# Patient Record
Sex: FEMALE | Race: WHITE | Employment: FULL TIME | URBAN - METROPOLITAN AREA
[De-identification: names, ages, dates, MRNs, and addresses within clinical notes are randomized per-mention and may not be internally consistent; named-entity substitution may affect disease eponyms.]

---

## 2019-04-24 ENCOUNTER — HOSPITAL ENCOUNTER (EMERGENCY)
Age: 64
Discharge: HOME | End: 2019-04-24
Attending: EMERGENCY MEDICINE
Payer: MEDICARE

## 2019-04-24 ENCOUNTER — APPOINTMENT (OUTPATIENT)
Dept: CT IMAGING | Age: 64
End: 2019-04-24
Attending: PHYSICIAN ASSISTANT
Payer: MEDICARE

## 2019-04-24 VITALS
WEIGHT: 215 LBS | BODY MASS INDEX: 40.59 KG/M2 | DIASTOLIC BLOOD PRESSURE: 69 MMHG | RESPIRATION RATE: 20 BRPM | SYSTOLIC BLOOD PRESSURE: 150 MMHG | HEART RATE: 63 BPM | OXYGEN SATURATION: 97 % | HEIGHT: 61 IN | TEMPERATURE: 97.7 F

## 2019-04-24 DIAGNOSIS — S00.03XA SCALP HEMATOMA, INITIAL ENCOUNTER: ICD-10-CM
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
## 2019-04-24 DIAGNOSIS — S09.90XA CLOSED HEAD INJURY, INITIAL ENCOUNTER: Primary | ICD-10-CM

## 2019-04-24 DIAGNOSIS — S06.0X0A CONCUSSION WITHOUT LOSS OF CONSCIOUSNESS, INITIAL ENCOUNTER: ICD-10-CM

## 2019-04-24 PROCEDURE — 74011250636 HC RX REV CODE- 250/636: Performed by: PHYSICIAN ASSISTANT

## 2019-04-24 PROCEDURE — 70450 CT HEAD/BRAIN W/O DYE: CPT

## 2019-04-24 PROCEDURE — 74011250637 HC RX REV CODE- 250/637: Performed by: PHYSICIAN ASSISTANT

## 2019-04-24 PROCEDURE — 99283 EMERGENCY DEPT VISIT LOW MDM: CPT

## 2019-04-24 RX ORDER — ONDANSETRON 4 MG/1
4 TABLET, ORALLY DISINTEGRATING ORAL
Qty: 12 TAB | Refills: 0 | Status: SHIPPED | OUTPATIENT
Start: 2019-04-24

## 2019-04-24 RX ORDER — MECLIZINE HYDROCHLORIDE 25 MG/1
25 TABLET ORAL
Qty: 21 TAB | Refills: 0 | Status: SHIPPED | OUTPATIENT
Start: 2019-04-24 | End: 2019-05-01

## 2019-04-24 RX ORDER — METOPROLOL SUCCINATE 50 MG/1
75 TABLET, EXTENDED RELEASE ORAL
COMMUNITY
Start: 2018-04-18

## 2019-04-24 RX ORDER — ACETAMINOPHEN 500 MG
1000 TABLET ORAL
Status: DISCONTINUED | OUTPATIENT
Start: 2019-04-24 | End: 2019-04-24 | Stop reason: HOSPADM

## 2019-04-24 RX ORDER — SODIUM CHLORIDE FOR INHALATION 0.9 %
1 VIAL, NEBULIZER (ML) INHALATION
COMMUNITY

## 2019-04-24 RX ORDER — ATORVASTATIN CALCIUM 40 MG/1
TABLET, FILM COATED ORAL
COMMUNITY
Start: 2018-06-21

## 2019-04-24 RX ORDER — ONDANSETRON 4 MG/1
8 TABLET, ORALLY DISINTEGRATING ORAL
Status: COMPLETED | OUTPATIENT
Start: 2019-04-24 | End: 2019-04-24

## 2019-04-24 RX ORDER — OMEPRAZOLE 20 MG/1
20 CAPSULE, DELAYED RELEASE ORAL
COMMUNITY
Start: 2013-12-27

## 2019-04-24 RX ORDER — MECLIZINE HCL 12.5 MG 12.5 MG/1
25 TABLET ORAL
Status: COMPLETED | OUTPATIENT
Start: 2019-04-24 | End: 2019-04-24

## 2019-04-24 RX ADMIN — ONDANSETRON 8 MG: 4 TABLET, ORALLY DISINTEGRATING ORAL at 11:39

## 2019-04-24 RX ADMIN — MECLIZINE 25 MG: 12.5 TABLET ORAL at 11:39

## 2019-04-24 NOTE — ED TRIAGE NOTES
Patient states she fell down 2 steps in the garage. Patient with complaints of headache, vomiting and lump on head. No LOC

## 2019-04-24 NOTE — LETTER
[facility] [place] 
[facility] 
[address] [id number] [phone] Work/School Note Date: 4/24/2019 To Whom It May concern: 
 
Anne Vasquez was seen and treated today in the emergency room by the following provider(s): 
Attending Provider: Basilio Rooney DO Physician Assistant: Wili Prince PA-C. Anne Vasquez may return to work on 05/01/2019. Sincerely, Cici Kimball PA-C

## 2019-04-24 NOTE — ED PROVIDER NOTES
EMERGENCY DEPARTMENT HISTORY AND PHYSICAL EXAM 
 
Date: 4/24/2019 Patient Name: Charly Hinson History of Presenting Illness Chief Complaint Patient presents with  
 Head Injury History Provided By: Patient Chief Complaint: head trauma HPI(Context):  
11:20 AM 
Charly Hinson is a 70 y.o. female with PMHX of asthma, HTN, HLP who presents to the emergency department C/O head trauma. Pt had mechanical fall on steps at home 5 hours ago. Associated sxs include headache, nausea, vomiting, dizziness. Pt was able to rise to her feet without assistance. No LOC. Pt went to [facility] but told to come to ED. Pt denies visual disturbances, N/W/T, neck or back pain, blood thinner use, ETOH use, and any other sxs or complaints. PCP: Mely Kohler MD 
 
Current Facility-Administered Medications Medication Dose Route Frequency Provider Last Rate Last Dose  acetaminophen (TYLENOL) tablet 1,000 mg  1,000 mg Oral NOW Rodrick Waggoner PA-C Current Outpatient Medications Medication Sig Dispense Refill  omeprazole (PRILOSEC) 20 mg capsule Take 20 mg by mouth.  atorvastatin (LIPITOR) 40 mg tablet take 1 tablet by mouth once daily  metoprolol succinate (TOPROL-XL) 50 mg XL tablet Take 75 mg by mouth.  ondansetron (ZOFRAN ODT) 4 mg disintegrating tablet Take 1 Tab by mouth every eight (8) hours as needed for Nausea (or vomiting.). Allow to dissolve on tongue 12 Tab 0  
 meclizine (ANTIVERT) 25 mg tablet Take 1 Tab by mouth three (3) times daily as needed for Dizziness for up to 7 days. 21 Tab 0  
 conjugated estrogens (PREMARIN) 0.625 mg/gram vaginal cream Insert 0.5 g into vagina.  sodium chloride 0.9 % nebu Take 1 Spray by inhalation. Past History Past Medical History: 
Past Medical History:  
Diagnosis Date  Asthma   
 Hiatal hernia  Menopause Age 46  MVP (mitral valve prolapse)  Sjogren's disease ([facility] [address]  Unspecified endocrine disorder Premarin cream  
 
 
Past Surgical History: 
Past Surgical History:  
Procedure Laterality Date  HX CATARACT REMOVAL    
 HX HYSTERECTOMY Age 45  HX KNEE ARTHROSCOPY    
 HX TUBAL LIGATION Family History: No family history on file. Social History: 
Social History Tobacco Use  Smoking status: Never Smoker  Smokeless tobacco: Never Used Substance Use Topics  Alcohol use: Yes  Drug use: Not on file Allergies: Allergies Allergen Reactions  Aztreonam Rash  Celecoxib Rash  Cephalosporins Rash  Pcn [Penicillins] Rash  Sulfa (Sulfonamide Antibiotics) Rash Review of Systems Review of Systems Constitutional: Negative for activity change. HENT: Negative for facial swelling. Eyes: Negative for photophobia and visual disturbance. Gastrointestinal: Positive for nausea and vomiting. Musculoskeletal: Negative for back pain and neck pain. Skin: Negative for wound. Neurological: Positive for dizziness and headaches. Negative for syncope, weakness, light-headedness and numbness. Hematological: Does not bruise/bleed easily. Psychiatric/Behavioral: Negative for confusion. All other systems reviewed and are negative. Physical Exam  
 
Vitals:  
 04/24/19 1111 BP: 150/69 Pulse: 63 Resp: 20 Temp: 97.7 °F (36.5 °C) SpO2: 97% Weight: 97.5 kg (215 lb) Height: 5' 1\" (1.549 m) Physical Exam  
Constitutional: She is oriented to person, place, and time. She appears well-developed and well-nourished. No distress.  female in NAD. Alert. Laying supine. Answering questions. Following directions. HENT:  
Head: Normocephalic and atraumatic. Head is without raccoon's eyes, without Cuello's sign, without abrasion and without contusion. Right Ear: External ear normal. No hemotympanum. Left Ear: External ear normal. No hemotympanum. Nose: Nose normal. No rhinorrhea. Mouth/Throat: Uvula is midline, oropharynx is clear and moist and mucous membranes are normal.  
Eyes: Pupils are equal, round, and reactive to light. Conjunctivae and EOM are normal. Right eye exhibits no discharge. Neck: Normal range of motion and full passive range of motion without pain. No spinous process tenderness and no muscular tenderness present. Normal range of motion present. Cardiovascular: Normal rate, regular rhythm, normal heart sounds and intact distal pulses. Exam reveals no gallop and no friction rub. No murmur heard. Pulmonary/Chest: Effort normal and breath sounds normal. No accessory muscle usage. No tachypnea. No respiratory distress. She has no decreased breath sounds. She has no wheezes. She has no rhonchi. She has no rales. Musculoskeletal: Normal range of motion. Thoracic back: She exhibits normal range of motion, no tenderness, no bony tenderness, no swelling, no deformity (no step off), no pain and no spasm. Lumbar back: She exhibits normal range of motion, no tenderness, no bony tenderness, no swelling, no deformity (no step off), no pain and no spasm. Lymphadenopathy:  
  She has no cervical adenopathy. Neurological: She is alert and oriented to person, place, and time. She has normal strength. No cranial nerve deficit or sensory deficit. Coordination normal. GCS eye subscore is 4. GCS verbal subscore is 5. GCS motor subscore is 6. Skin: Skin is warm and dry. No rash noted. She is not diaphoretic. No erythema. Psychiatric: She has a normal mood and affect. Judgment normal.  
Nursing note and vitals reviewed. Diagnostic Study Results Labs - No results found for this or any previous visit (from the past 12 hour(s)). CT HEAD WO CONT Final Result IMPRESSION:  
  
  
1. No acute intracranial abnormality. 2. Moderately large posterior superior occipital hematoma. No retained  
radiopaque foreign object or fracture. CT Results  (Last 48 hours) 04/24/19 1211  CT HEAD WO CONT Final result Impression:  IMPRESSION:  
   
   
1. No acute intracranial abnormality. 2. Moderately large posterior superior occipital hematoma. No retained  
radiopaque foreign object or fracture. Narrative:  EXAM: CT head INDICATION: Fall, nausea with vomiting. Occipital hematoma. COMPARISON: None. TECHNIQUE: Axial CT imaging of the head was performed without intravenous  
contrast.  
   
One or more dose reduction techniques were used on this CT: automated exposure  
control, adjustment of the mAs and/or kVp according to patient size, and  
iterative reconstruction techniques. The specific techniques used on this CT  
exam have been documented in the patient's electronic medical record. Digital  
Imaging and Communications in Medicine (DICOM) format image data are available  
to nonaffiliated external healthcare facilities or entities on a secure, media  
free, reciprocally searchable basis with patient authorization for at least a  
12-month period after this study. _______________ FINDINGS:  
   
BRAIN AND POSTERIOR FOSSA: Cortical and cerebellar volume loss is present,  
within normal limits for patient age. The ventricular size and configuration is  
normal. Basilar cisterns patent. There is no intracranial hemorrhage, mass  
effect, or midline shift. The gray-white matter differentiation is within normal  
limits. EXTRA-AXIAL SPACES AND MENINGES: No acute extra-axial fluid collection. CALVARIUM: No acute osseous abnormality. SINUSES: Imaged paranasal sinuses and mastoid air cells are clear. OTHER: Moderately large posterior superior occipital hematoma noted. No retained  
radiopaque foreign object.  
   
_______________ CXR Results  (Last 48 hours) None Medications given in the ED- Medications acetaminophen (TYLENOL) tablet 1,000 mg (has no administration in time range)  
ondansetron (ZOFRAN ODT) tablet 8 mg (8 mg Oral Given 4/24/19 1139) meclizine (ANTIVERT) tablet 25 mg (25 mg Oral Given 4/24/19 1139) Medical Decision Making I am the first provider for this patient. I reviewed the vital signs, available nursing notes, past medical history, past surgical history, family history and social history. Vital Signs-Reviewed the patient's vital signs. Pulse Oximetry Analysis - 97% on RA Records Reviewed: Nursing Notes Provider Notes (Medical Decision Making): concussion, skull fx, intracranial bleed, contusion, hematoma Procedures: 
Procedures ED Course:  
11:20 AM Initial assessment performed. The patients presenting problems have been discussed, and they are in agreement with the care plan formulated and outlined with them. I have encouraged them to ask questions as they arise throughout their visit. Diagnosis and Disposition Closed head injury without LOC. No FND. CT HEAD negative for intracranial process. No neck or back complaints with no midline spinal tenderness. Feels better with tx. Appreciative of care. Head precautions discussed in detail. PCP FU. Reasons to RTED discussed with pt. All questions answered. Pt feels comfortable going home at this time. Pt expressed understanding and she agrees with plan. 1. Closed head injury, initial encounter 2. Concussion without loss of consciousness, initial encounter 3. Scalp hematoma, initial encounter 4. Fall on stairs, initial encounter PLAN: 
1. D/C Home 2. Discharge Medication List as of 4/24/2019 12:48 PM  
  
START taking these medications Details  
ondansetron (ZOFRAN ODT) 4 mg disintegrating tablet Take 1 Tab by mouth every eight (8) hours as needed for Nausea (or vomiting.).  Allow to dissolve on tongue, Print, Disp-12 Tab, R-0  
  
  
 CONTINUE these medications which have NOT CHANGED Details  
omeprazole (PRILOSEC) 20 mg capsule Take 20 mg by mouth., Historical Med  
  
atorvastatin (LIPITOR) 40 mg tablet take 1 tablet by mouth once daily, Historical Med  
  
metoprolol succinate (TOPROL-XL) 50 mg XL tablet Take 75 mg by mouth., Historical Med  
  
conjugated estrogens (PREMARIN) 0.625 mg/gram vaginal cream Insert 0.5 g into vagina., Historical Med  
  
sodium chloride 0.9 % nebu Take 1 Spray by inhalation. , Historical Med 3. Follow-up Information Follow up With Specialties Details Why Contact Info Madisyn Coker MD Family Practice   [address] [place] [address] 
[phone] [facility] EMERGENCY DEPT Emergency Medicine  As needed, If symptoms worsen [address] [id number] [phone]  
  
 
_______________________________ Attestations: This note is prepared by Yamil Chester PA-C. 
_______________________________ Please note that this dictation was completed with [employer], the computer voice recognition software. Quite often unanticipated grammatical, syntax, homophones, and other interpretive errors are inadvertently transcribed by the computer software. Please disregard these errors. Please excuse any errors that have escaped final proofreading.

## 2024-10-22 ENCOUNTER — TRANSCRIBE ORDERS (OUTPATIENT)
Facility: HOSPITAL | Age: 69
End: 2024-10-22

## 2024-10-22 ENCOUNTER — HOSPITAL ENCOUNTER (OUTPATIENT)
Facility: HOSPITAL | Age: 69
Discharge: HOME | End: 2024-10-25
Payer: MEDICARE

## 2024-10-22 DIAGNOSIS — M16.11 PRIMARY OSTEOARTHRITIS OF RIGHT HIP: ICD-10-CM

## 2024-10-22 DIAGNOSIS — M16.11 PRIMARY OSTEOARTHRITIS OF RIGHT HIP: Primary | ICD-10-CM

## 2024-10-22 LAB
ALBUMIN SERPL-MCNC: 3.3 G/DL (ref 3.4–5)
ALBUMIN/GLOB SERPL: 0.9 (ref 0.8–1.7)
ALP SERPL-CCNC: 145 U/L (ref 45–117)
ALT SERPL-CCNC: 19 U/L (ref 13–56)
ANION GAP SERPL CALC-SCNC: 6 MMOL/L (ref 3–18)
APPEARANCE UR: CLEAR
APTT PPP: 25.3 SEC (ref 23–36.4)
AST SERPL-CCNC: 20 U/L (ref 10–38)
BACTERIA URNS QL MICRO: ABNORMAL /HPF
BASOPHILS # BLD: 0.1 K/UL (ref 0–0.1)
BASOPHILS NFR BLD: 1 % (ref 0–2)
BILIRUB SERPL-MCNC: 0.4 MG/DL (ref 0.2–1)
BILIRUB UR QL: NEGATIVE
BUN SERPL-MCNC: 17 MG/DL (ref 7–18)
BUN/CREAT SERPL: 19 (ref 12–20)
CALCIUM SERPL-MCNC: 9.1 MG/DL (ref 8.5–10.1)
CHLORIDE SERPL-SCNC: 106 MMOL/L (ref 100–111)
CO2 SERPL-SCNC: 26 MMOL/L (ref 21–32)
COLOR UR: YELLOW
CREAT SERPL-MCNC: 0.9 MG/DL (ref 0.6–1.3)
DIFFERENTIAL METHOD BLD: ABNORMAL
EKG ATRIAL RATE: 85 BPM
EKG DIAGNOSIS: NORMAL
EKG P AXIS: 17 DEGREES
EKG P-R INTERVAL: 116 MS
EKG Q-T INTERVAL: 362 MS
EKG QRS DURATION: 82 MS
EKG QTC CALCULATION (BAZETT): 430 MS
EKG R AXIS: 15 DEGREES
EKG T AXIS: 57 DEGREES
EKG VENTRICULAR RATE: 85 BPM
EOSINOPHIL # BLD: 0 K/UL (ref 0–0.4)
EOSINOPHIL NFR BLD: 0 % (ref 0–5)
EPITH CASTS URNS QL MICRO: ABNORMAL /LPF (ref 0–5)
ERYTHROCYTE [DISTWIDTH] IN BLOOD BY AUTOMATED COUNT: 14.6 % (ref 11.6–14.5)
ERYTHROCYTE [SEDIMENTATION RATE] IN BLOOD: 32 MM/HR (ref 0–30)
EST. AVERAGE GLUCOSE BLD GHB EST-MCNC: 114 MG/DL
GLOBULIN SER CALC-MCNC: 3.6 G/DL (ref 2–4)
GLUCOSE SERPL-MCNC: 100 MG/DL (ref 74–99)
GLUCOSE UR STRIP.AUTO-MCNC: NEGATIVE MG/DL
HBA1C MFR BLD: 5.6 % (ref 4.2–5.6)
HCT VFR BLD AUTO: 45.2 % (ref 35–45)
HGB BLD-MCNC: 14.8 G/DL (ref 12–16)
HGB UR QL STRIP: NEGATIVE
IMM GRANULOCYTES # BLD AUTO: 0 K/UL (ref 0–0.04)
IMM GRANULOCYTES NFR BLD AUTO: 0 % (ref 0–0.5)
INR PPP: 1 (ref 0.9–1.1)
KETONES UR QL STRIP.AUTO: NEGATIVE MG/DL
LEUKOCYTE ESTERASE UR QL STRIP.AUTO: ABNORMAL
LYMPHOCYTES # BLD: 1.8 K/UL (ref 0.9–3.6)
LYMPHOCYTES NFR BLD: 17 % (ref 21–52)
MCH RBC QN AUTO: 29.1 PG (ref 24–34)
MCHC RBC AUTO-ENTMCNC: 32.7 G/DL (ref 31–37)
MCV RBC AUTO: 88.8 FL (ref 78–100)
MONOCYTES # BLD: 0.8 K/UL (ref 0.05–1.2)
MONOCYTES NFR BLD: 8 % (ref 3–10)
NEUTS SEG # BLD: 8.2 K/UL (ref 1.8–8)
NEUTS SEG NFR BLD: 75 % (ref 40–73)
NITRITE UR QL STRIP.AUTO: NEGATIVE
NRBC # BLD: 0 K/UL (ref 0–0.01)
NRBC BLD-RTO: 0 PER 100 WBC
PH UR STRIP: 6.5 (ref 5–8)
PLATELET # BLD AUTO: 321 K/UL (ref 135–420)
PMV BLD AUTO: 10.7 FL (ref 9.2–11.8)
POTASSIUM SERPL-SCNC: 4.7 MMOL/L (ref 3.5–5.5)
PROT SERPL-MCNC: 6.9 G/DL (ref 6.4–8.2)
PROT UR STRIP-MCNC: NEGATIVE MG/DL
PROTHROMBIN TIME: 13.6 SEC (ref 11.9–14.9)
RBC # BLD AUTO: 5.09 M/UL (ref 4.2–5.3)
RBC #/AREA URNS HPF: ABNORMAL /HPF (ref 0–5)
SODIUM SERPL-SCNC: 138 MMOL/L (ref 136–145)
SP GR UR REFRACTOMETRY: 1.01 (ref 1–1.03)
UROBILINOGEN UR QL STRIP.AUTO: 0.2 EU/DL (ref 0.2–1)
WBC # BLD AUTO: 10.9 K/UL (ref 4.6–13.2)
WBC URNS QL MICRO: ABNORMAL /HPF (ref 0–5)

## 2024-10-22 PROCEDURE — 81001 URINALYSIS AUTO W/SCOPE: CPT

## 2024-10-22 PROCEDURE — 85730 THROMBOPLASTIN TIME PARTIAL: CPT

## 2024-10-22 PROCEDURE — 85610 PROTHROMBIN TIME: CPT

## 2024-10-22 PROCEDURE — 85025 COMPLETE CBC W/AUTO DIFF WBC: CPT

## 2024-10-22 PROCEDURE — 80053 COMPREHEN METABOLIC PANEL: CPT

## 2024-10-22 PROCEDURE — 36415 COLL VENOUS BLD VENIPUNCTURE: CPT

## 2024-10-22 PROCEDURE — 83036 HEMOGLOBIN GLYCOSYLATED A1C: CPT

## 2024-10-22 PROCEDURE — 93005 ELECTROCARDIOGRAM TRACING: CPT

## 2024-10-22 PROCEDURE — 85652 RBC SED RATE AUTOMATED: CPT

## 2024-10-24 LAB
BACTERIA SPEC CULT: NORMAL
BACTERIA SPEC CULT: NORMAL
SERVICE CMNT-IMP: NORMAL

## 2024-11-15 PROBLEM — M16.11 OSTEOARTHRITIS OF RIGHT HIP: Chronic | Status: ACTIVE | Noted: 2024-11-15

## 2024-11-15 RX ORDER — DIPHENHYDRAMINE HCL 25 MG
25 CAPSULE ORAL EVERY 6 HOURS PRN
Status: CANCELLED | OUTPATIENT
Start: 2024-11-15

## 2024-11-15 RX ORDER — SODIUM CHLORIDE 9 MG/ML
INJECTION, SOLUTION INTRAVENOUS CONTINUOUS
Status: CANCELLED | OUTPATIENT
Start: 2024-11-15

## 2024-11-15 RX ORDER — KETOROLAC TROMETHAMINE 15 MG/ML
15 INJECTION, SOLUTION INTRAMUSCULAR; INTRAVENOUS EVERY 6 HOURS
Status: CANCELLED | OUTPATIENT
Start: 2024-11-15 | End: 2024-11-18

## 2024-11-15 RX ORDER — ONDANSETRON 2 MG/ML
4 INJECTION INTRAMUSCULAR; INTRAVENOUS EVERY 6 HOURS PRN
Status: CANCELLED | OUTPATIENT
Start: 2024-11-15

## 2024-11-15 RX ORDER — SODIUM CHLORIDE 0.9 % (FLUSH) 0.9 %
5-40 SYRINGE (ML) INJECTION PRN
Status: CANCELLED | OUTPATIENT
Start: 2024-11-15

## 2024-11-15 RX ORDER — ACETAMINOPHEN 325 MG/1
650 TABLET ORAL EVERY 6 HOURS
Status: CANCELLED | OUTPATIENT
Start: 2024-11-15

## 2024-11-15 RX ORDER — DIPHENHYDRAMINE HYDROCHLORIDE 50 MG/ML
25 INJECTION INTRAMUSCULAR; INTRAVENOUS EVERY 6 HOURS PRN
Status: CANCELLED | OUTPATIENT
Start: 2024-11-15

## 2024-11-15 NOTE — H&P
[facility] Orthopaedics & Sports Medicine  History and Physical Exam    Patient: Farzana Membreno MRN: [id number]  SSN: [id number]    YOB: 1955  Age: 69 y.o.  Sex: female      Subjective:      Chief Complaint: right hip pain    History of Present Illness:  Patient complains of right hip pain and difficulty ambulating, which has progressed over the past several months.  X-rays showed osteoarthritis of the joint.  The patient's pain has persisted and progressed despite conservative treatments and therapies.   The patient has been previously treated with medications and/or injections.  The patient has at this time opted for surgical intervention.       Past Medical History:   Diagnosis Date    Anxiety     not taking medication    Asthma     mild per 12/29/21 pulmonary note Shadi Horacio    Drusen of macula, bilateral     Jose Ashraf    GERD (gastroesophageal reflux disease)     History of being hospitalized 01/18/2022    discharged 2/3/22 acute on chronic respiratory failure with hypoxia and hypercapnia secondary to COVID 19 pneumonitis    Hyperlipidemia     [place] Morie    Interstitial lung disease (HCC) 2019    previously followed by Alexander Leonard    MGD (meibomian gland disease)     Jose Ashraf    Osteoarthritis     hip, Ehsan Sellers    Osteoarthritis of right hip 11/15/2024    Palpitations     at least as far back as 2017, previously followed by Derian English    Sjogren's disease (HCC)     \"it was a long time ago\" not currently seeing any specialists    Wears glasses      Past Surgical History:   Procedure Laterality Date    BLADDER SURGERY  2006    CAPSULOTOMY Left 11/16/2017    YAG    CATARACT EXTRACTION W/ INTRAOCULAR LENS IMPLANT Right 12/19/2016    CATARACT EXTRACTION W/ INTRAOCULAR LENS IMPLANT Left 01/09/2017    HYSTERECTOMY (CERVIX STATUS UNKNOWN)  09/2009    KNEE ARTHROSCOPY Left 2013    TUBAL LIGATION  1987     Social History     Occupational History    Not on file   Tobacco Use    Smoking

## 2024-11-18 ENCOUNTER — ANESTHESIA EVENT (OUTPATIENT)
Facility: HOSPITAL | Age: 69
End: 2024-11-18
Payer: MEDICARE

## 2024-11-18 ENCOUNTER — ANESTHESIA (OUTPATIENT)
Facility: HOSPITAL | Age: 69
End: 2024-11-18
Payer: MEDICARE

## 2024-11-18 ENCOUNTER — APPOINTMENT (OUTPATIENT)
Facility: HOSPITAL | Age: 69
End: 2024-11-18
Attending: ORTHOPAEDIC SURGERY
Payer: MEDICARE

## 2024-11-18 ENCOUNTER — HOSPITAL ENCOUNTER (OUTPATIENT)
Facility: HOSPITAL | Age: 69
Setting detail: OUTPATIENT SURGERY
Discharge: HOME HEALTH | End: 2024-11-18
Attending: ORTHOPAEDIC SURGERY | Admitting: ORTHOPAEDIC SURGERY
Payer: MEDICARE

## 2024-11-18 VITALS
WEIGHT: 194.8 LBS | HEIGHT: 64 IN | SYSTOLIC BLOOD PRESSURE: 137 MMHG | OXYGEN SATURATION: 96 % | TEMPERATURE: 98.3 F | BODY MASS INDEX: 33.26 KG/M2 | RESPIRATION RATE: 18 BRPM | DIASTOLIC BLOOD PRESSURE: 50 MMHG | HEART RATE: 85 BPM

## 2024-11-18 DIAGNOSIS — M16.11 PRIMARY OSTEOARTHRITIS OF RIGHT HIP: Primary | Chronic | ICD-10-CM

## 2024-11-18 LAB
ABO + RH BLD: NORMAL
BLOOD GROUP ANTIBODIES SERPL: NORMAL
SPECIMEN EXP DATE BLD: NORMAL

## 2024-11-18 PROCEDURE — 3600000015 HC SURGERY LEVEL 5 ADDTL 15MIN: Performed by: ORTHOPAEDIC SURGERY

## 2024-11-18 PROCEDURE — 2580000003 HC RX 258: Performed by: ORTHOPAEDIC SURGERY

## 2024-11-18 PROCEDURE — 7100000011 HC PHASE II RECOVERY - ADDTL 15 MIN: Performed by: ORTHOPAEDIC SURGERY

## 2024-11-18 PROCEDURE — 36415 COLL VENOUS BLD VENIPUNCTURE: CPT

## 2024-11-18 PROCEDURE — 7100000000 HC PACU RECOVERY - FIRST 15 MIN: Performed by: ORTHOPAEDIC SURGERY

## 2024-11-18 PROCEDURE — 97535 SELF CARE MNGMENT TRAINING: CPT

## 2024-11-18 PROCEDURE — 97116 GAIT TRAINING THERAPY: CPT

## 2024-11-18 PROCEDURE — 7100000001 HC PACU RECOVERY - ADDTL 15 MIN: Performed by: ORTHOPAEDIC SURGERY

## 2024-11-18 PROCEDURE — 2720000010 HC SURG SUPPLY STERILE: Performed by: ORTHOPAEDIC SURGERY

## 2024-11-18 PROCEDURE — C1776 JOINT DEVICE (IMPLANTABLE): HCPCS | Performed by: ORTHOPAEDIC SURGERY

## 2024-11-18 PROCEDURE — 6360000002 HC RX W HCPCS: Performed by: PHYSICIAN ASSISTANT

## 2024-11-18 PROCEDURE — 3700000000 HC ANESTHESIA ATTENDED CARE: Performed by: ORTHOPAEDIC SURGERY

## 2024-11-18 PROCEDURE — 6370000000 HC RX 637 (ALT 250 FOR IP): Performed by: PHYSICIAN ASSISTANT

## 2024-11-18 PROCEDURE — 2500000003 HC RX 250 WO HCPCS: Performed by: NURSE ANESTHETIST, CERTIFIED REGISTERED

## 2024-11-18 PROCEDURE — A4217 STERILE WATER/SALINE, 500 ML: HCPCS | Performed by: ORTHOPAEDIC SURGERY

## 2024-11-18 PROCEDURE — 7100000010 HC PHASE II RECOVERY - FIRST 15 MIN: Performed by: ORTHOPAEDIC SURGERY

## 2024-11-18 PROCEDURE — 86900 BLOOD TYPING SEROLOGIC ABO: CPT

## 2024-11-18 PROCEDURE — 2709999900 HC NON-CHARGEABLE SUPPLY: Performed by: ORTHOPAEDIC SURGERY

## 2024-11-18 PROCEDURE — 6360000002 HC RX W HCPCS: Performed by: NURSE ANESTHETIST, CERTIFIED REGISTERED

## 2024-11-18 PROCEDURE — 3700000001 HC ADD 15 MINUTES (ANESTHESIA): Performed by: ORTHOPAEDIC SURGERY

## 2024-11-18 PROCEDURE — 86901 BLOOD TYPING SEROLOGIC RH(D): CPT

## 2024-11-18 PROCEDURE — 6360000002 HC RX W HCPCS: Performed by: ORTHOPAEDIC SURGERY

## 2024-11-18 PROCEDURE — 86850 RBC ANTIBODY SCREEN: CPT

## 2024-11-18 PROCEDURE — 97161 PT EVAL LOW COMPLEX 20 MIN: CPT

## 2024-11-18 PROCEDURE — 6360000002 HC RX W HCPCS: Performed by: STUDENT IN AN ORGANIZED HEALTH CARE EDUCATION/TRAINING PROGRAM

## 2024-11-18 PROCEDURE — 6370000000 HC RX 637 (ALT 250 FOR IP): Performed by: ORTHOPAEDIC SURGERY

## 2024-11-18 PROCEDURE — C1713 ANCHOR/SCREW BN/BN,TIS/BN: HCPCS | Performed by: ORTHOPAEDIC SURGERY

## 2024-11-18 PROCEDURE — 73501 X-RAY EXAM HIP UNI 1 VIEW: CPT

## 2024-11-18 PROCEDURE — 97165 OT EVAL LOW COMPLEX 30 MIN: CPT

## 2024-11-18 PROCEDURE — 3600000005 HC SURGERY LEVEL 5 BASE: Performed by: ORTHOPAEDIC SURGERY

## 2024-11-18 DEVICE — FEMORAL HEAD 36-12/14+0
Type: IMPLANTABLE DEVICE | Site: HIP | Status: FUNCTIONAL
Brand: DELTA CERAMIC FEMORAL HEAD

## 2024-11-18 DEVICE — ACETABULAR LINER NEUTRAL 36/50
Type: IMPLANTABLE DEVICE | Site: HIP | Status: FUNCTIONAL
Brand: LEGEND

## 2024-11-18 DEVICE — SIZE 13 STD COLLAR
Type: IMPLANTABLE DEVICE | Site: HIP | Status: FUNCTIONAL
Brand: ENTRADA HIP STEM

## 2024-11-18 DEVICE — THREE HOLE, 50 MM
Type: IMPLANTABLE DEVICE | Site: HIP | Status: FUNCTIONAL
Brand: LEGEND ACETABULAR SHELL

## 2024-11-18 RX ORDER — HYDROMORPHONE HYDROCHLORIDE 1 MG/ML
0.5 INJECTION, SOLUTION INTRAMUSCULAR; INTRAVENOUS; SUBCUTANEOUS EVERY 5 MIN PRN
Status: DISCONTINUED | OUTPATIENT
Start: 2024-11-18 | End: 2024-11-18 | Stop reason: HOSPADM

## 2024-11-18 RX ORDER — SODIUM CHLORIDE, SODIUM LACTATE, POTASSIUM CHLORIDE, CALCIUM CHLORIDE 600; 310; 30; 20 MG/100ML; MG/100ML; MG/100ML; MG/100ML
INJECTION, SOLUTION INTRAVENOUS CONTINUOUS
Status: DISCONTINUED | OUTPATIENT
Start: 2024-11-18 | End: 2024-11-18

## 2024-11-18 RX ORDER — SODIUM CHLORIDE 9 MG/ML
INJECTION, SOLUTION INTRAVENOUS PRN
Status: DISCONTINUED | OUTPATIENT
Start: 2024-11-18 | End: 2024-11-18 | Stop reason: HOSPADM

## 2024-11-18 RX ORDER — SODIUM CHLORIDE 0.9 % (FLUSH) 0.9 %
5-40 SYRINGE (ML) INJECTION EVERY 12 HOURS SCHEDULED
Status: DISCONTINUED | OUTPATIENT
Start: 2024-11-18 | End: 2024-11-18 | Stop reason: HOSPADM

## 2024-11-18 RX ORDER — TRANEXAMIC ACID 650 MG/1
1950 TABLET ORAL ONCE
Status: COMPLETED | OUTPATIENT
Start: 2024-11-18 | End: 2024-11-18

## 2024-11-18 RX ORDER — MIDAZOLAM HYDROCHLORIDE 2 MG/2ML
2 INJECTION, SOLUTION INTRAMUSCULAR; INTRAVENOUS
Status: DISCONTINUED | OUTPATIENT
Start: 2024-11-18 | End: 2024-11-18 | Stop reason: HOSPADM

## 2024-11-18 RX ORDER — ASPIRIN 81 MG/1
81 TABLET ORAL 2 TIMES DAILY
Qty: 42 TABLET | Refills: 0 | Status: SHIPPED | OUTPATIENT
Start: 2024-11-18 | End: 2024-12-09

## 2024-11-18 RX ORDER — ONDANSETRON 2 MG/ML
INJECTION INTRAMUSCULAR; INTRAVENOUS
Status: DISCONTINUED | OUTPATIENT
Start: 2024-11-18 | End: 2024-11-18 | Stop reason: SDUPTHER

## 2024-11-18 RX ORDER — MAGNESIUM HYDROXIDE 1200 MG/15ML
LIQUID ORAL CONTINUOUS PRN
Status: COMPLETED | OUTPATIENT
Start: 2024-11-18 | End: 2024-11-18

## 2024-11-18 RX ORDER — SODIUM CHLORIDE 0.9 % (FLUSH) 0.9 %
5-40 SYRINGE (ML) INJECTION PRN
Status: DISCONTINUED | OUTPATIENT
Start: 2024-11-18 | End: 2024-11-18 | Stop reason: HOSPADM

## 2024-11-18 RX ORDER — MELOXICAM 7.5 MG/1
7.5 TABLET ORAL 2 TIMES DAILY
Qty: 28 TABLET | Refills: 0 | Status: SHIPPED | OUTPATIENT
Start: 2024-11-18 | End: 2024-12-02

## 2024-11-18 RX ORDER — DROPERIDOL 2.5 MG/ML
0.62 INJECTION, SOLUTION INTRAMUSCULAR; INTRAVENOUS
Status: DISCONTINUED | OUTPATIENT
Start: 2024-11-18 | End: 2024-11-18 | Stop reason: HOSPADM

## 2024-11-18 RX ORDER — LABETALOL HYDROCHLORIDE 5 MG/ML
10 INJECTION, SOLUTION INTRAVENOUS
Status: DISCONTINUED | OUTPATIENT
Start: 2024-11-18 | End: 2024-11-18 | Stop reason: HOSPADM

## 2024-11-18 RX ORDER — DEXAMETHASONE SODIUM PHOSPHATE 4 MG/ML
8 INJECTION, SOLUTION INTRA-ARTICULAR; INTRALESIONAL; INTRAMUSCULAR; INTRAVENOUS; SOFT TISSUE ONCE
Status: COMPLETED | OUTPATIENT
Start: 2024-11-18 | End: 2024-11-18

## 2024-11-18 RX ORDER — ACETAMINOPHEN 500 MG
1000 TABLET ORAL ONCE
Status: COMPLETED | OUTPATIENT
Start: 2024-11-18 | End: 2024-11-18

## 2024-11-18 RX ORDER — DIPHENHYDRAMINE HYDROCHLORIDE 50 MG/ML
12.5 INJECTION INTRAMUSCULAR; INTRAVENOUS
Status: DISCONTINUED | OUTPATIENT
Start: 2024-11-18 | End: 2024-11-18 | Stop reason: HOSPADM

## 2024-11-18 RX ORDER — OXYCODONE HYDROCHLORIDE 5 MG/1
10 TABLET ORAL PRN
Status: DISCONTINUED | OUTPATIENT
Start: 2024-11-18 | End: 2024-11-18 | Stop reason: HOSPADM

## 2024-11-18 RX ORDER — SODIUM CHLORIDE, SODIUM LACTATE, POTASSIUM CHLORIDE, CALCIUM CHLORIDE 600; 310; 30; 20 MG/100ML; MG/100ML; MG/100ML; MG/100ML
INJECTION, SOLUTION INTRAVENOUS CONTINUOUS
Status: DISCONTINUED | OUTPATIENT
Start: 2024-11-18 | End: 2024-11-18 | Stop reason: HOSPADM

## 2024-11-18 RX ORDER — OXYCODONE HYDROCHLORIDE 5 MG/1
10 TABLET ORAL EVERY 4 HOURS PRN
Status: DISCONTINUED | OUTPATIENT
Start: 2024-11-18 | End: 2024-11-18 | Stop reason: HOSPADM

## 2024-11-18 RX ORDER — SODIUM CHLORIDE 9 MG/ML
INJECTION, SOLUTION INTRAVENOUS CONTINUOUS
Status: DISCONTINUED | OUTPATIENT
Start: 2024-11-18 | End: 2024-11-18 | Stop reason: HOSPADM

## 2024-11-18 RX ORDER — PROPOFOL 10 MG/ML
INJECTION, EMULSION INTRAVENOUS
Status: DISCONTINUED | OUTPATIENT
Start: 2024-11-18 | End: 2024-11-18 | Stop reason: SDUPTHER

## 2024-11-18 RX ORDER — CEFADROXIL 500 MG/1
500 CAPSULE ORAL 2 TIMES DAILY
Qty: 10 CAPSULE | Refills: 0 | Status: SHIPPED | OUTPATIENT
Start: 2024-11-18 | End: 2024-11-23

## 2024-11-18 RX ORDER — FENTANYL CITRATE 50 UG/ML
50 INJECTION, SOLUTION INTRAMUSCULAR; INTRAVENOUS EVERY 5 MIN PRN
Status: DISCONTINUED | OUTPATIENT
Start: 2024-11-18 | End: 2024-11-18 | Stop reason: HOSPADM

## 2024-11-18 RX ORDER — ONDANSETRON 2 MG/ML
4 INJECTION INTRAMUSCULAR; INTRAVENOUS
Status: DISCONTINUED | OUTPATIENT
Start: 2024-11-18 | End: 2024-11-18 | Stop reason: HOSPADM

## 2024-11-18 RX ORDER — LIDOCAINE HYDROCHLORIDE 20 MG/ML
INJECTION, SOLUTION EPIDURAL; INFILTRATION; INTRACAUDAL; PERINEURAL
Status: DISCONTINUED | OUTPATIENT
Start: 2024-11-18 | End: 2024-11-18 | Stop reason: SDUPTHER

## 2024-11-18 RX ORDER — GLYCOPYRROLATE 0.2 MG/ML
INJECTION INTRAMUSCULAR; INTRAVENOUS
Status: DISCONTINUED | OUTPATIENT
Start: 2024-11-18 | End: 2024-11-18 | Stop reason: SDUPTHER

## 2024-11-18 RX ORDER — OXYCODONE HYDROCHLORIDE 5 MG/1
5 TABLET ORAL PRN
Status: DISCONTINUED | OUTPATIENT
Start: 2024-11-18 | End: 2024-11-18 | Stop reason: HOSPADM

## 2024-11-18 RX ORDER — NALOXONE HYDROCHLORIDE 0.4 MG/ML
INJECTION, SOLUTION INTRAMUSCULAR; INTRAVENOUS; SUBCUTANEOUS PRN
Status: DISCONTINUED | OUTPATIENT
Start: 2024-11-18 | End: 2024-11-18 | Stop reason: HOSPADM

## 2024-11-18 RX ORDER — OXYCODONE HYDROCHLORIDE 5 MG/1
5 TABLET ORAL EVERY 4 HOURS PRN
Status: DISCONTINUED | OUTPATIENT
Start: 2024-11-18 | End: 2024-11-18 | Stop reason: HOSPADM

## 2024-11-18 RX ORDER — PANTOPRAZOLE SODIUM 40 MG/1
40 TABLET, DELAYED RELEASE ORAL ONCE
Status: COMPLETED | OUTPATIENT
Start: 2024-11-18 | End: 2024-11-18

## 2024-11-18 RX ORDER — POVIDONE-IODINE 5 %
SOLUTION, NON-ORAL OPHTHALMIC (EYE) PRN
Status: DISCONTINUED | OUTPATIENT
Start: 2024-11-18 | End: 2024-11-18 | Stop reason: ALTCHOICE

## 2024-11-18 RX ORDER — LABETALOL HYDROCHLORIDE 5 MG/ML
INJECTION, SOLUTION INTRAVENOUS
Status: DISCONTINUED | OUTPATIENT
Start: 2024-11-18 | End: 2024-11-18 | Stop reason: SDUPTHER

## 2024-11-18 RX ORDER — ACETAMINOPHEN 500 MG
500 TABLET ORAL EVERY 6 HOURS PRN
COMMUNITY

## 2024-11-18 RX ORDER — MIDAZOLAM HYDROCHLORIDE 1 MG/ML
INJECTION, SOLUTION INTRAMUSCULAR; INTRAVENOUS
Status: DISCONTINUED | OUTPATIENT
Start: 2024-11-18 | End: 2024-11-18 | Stop reason: SDUPTHER

## 2024-11-18 RX ORDER — OXYCODONE HYDROCHLORIDE 5 MG/1
5 TABLET ORAL EVERY 4 HOURS PRN
Qty: 42 TABLET | Refills: 0 | Status: SHIPPED | OUTPATIENT
Start: 2024-11-18 | End: 2024-11-25

## 2024-11-18 RX ADMIN — HYDROMORPHONE HYDROCHLORIDE 0.5 MG: 1 INJECTION, SOLUTION INTRAMUSCULAR; INTRAVENOUS; SUBCUTANEOUS at 13:41

## 2024-11-18 RX ADMIN — ONDANSETRON HYDROCHLORIDE 4 MG: 2 INJECTION INTRAMUSCULAR; INTRAVENOUS at 11:52

## 2024-11-18 RX ADMIN — HYDROMORPHONE HYDROCHLORIDE 1 MG: 1 INJECTION, SOLUTION INTRAMUSCULAR; INTRAVENOUS; SUBCUTANEOUS at 12:26

## 2024-11-18 RX ADMIN — TRANEXAMIC ACID 1950 MG: 650 TABLET ORAL at 09:47

## 2024-11-18 RX ADMIN — LIDOCAINE HYDROCHLORIDE 80 MG: 20 INJECTION, SOLUTION EPIDURAL; INFILTRATION; INTRACAUDAL; PERINEURAL at 11:49

## 2024-11-18 RX ADMIN — PROPOFOL 200 MG: 10 INJECTION, EMULSION INTRAVENOUS at 11:49

## 2024-11-18 RX ADMIN — FENTANYL CITRATE 50 MCG: 50 INJECTION INTRAMUSCULAR; INTRAVENOUS at 13:25

## 2024-11-18 RX ADMIN — HYDROMORPHONE HYDROCHLORIDE 0.5 MG: 1 INJECTION, SOLUTION INTRAMUSCULAR; INTRAVENOUS; SUBCUTANEOUS at 14:04

## 2024-11-18 RX ADMIN — HYDROMORPHONE HYDROCHLORIDE 1 MG: 1 INJECTION, SOLUTION INTRAMUSCULAR; INTRAVENOUS; SUBCUTANEOUS at 11:49

## 2024-11-18 RX ADMIN — FENTANYL CITRATE 50 MCG: 50 INJECTION INTRAMUSCULAR; INTRAVENOUS at 13:33

## 2024-11-18 RX ADMIN — LABETALOL HYDROCHLORIDE 10 MG: 5 INJECTION, SOLUTION INTRAVENOUS at 12:22

## 2024-11-18 RX ADMIN — ACETAMINOPHEN 1000 MG: 500 TABLET ORAL at 09:47

## 2024-11-18 RX ADMIN — SODIUM CHLORIDE: 9 INJECTION, SOLUTION INTRAVENOUS at 09:49

## 2024-11-18 RX ADMIN — WATER 2000 MG: 1 INJECTION INTRAMUSCULAR; INTRAVENOUS; SUBCUTANEOUS at 11:44

## 2024-11-18 RX ADMIN — PANTOPRAZOLE SODIUM 40 MG: 40 TABLET, DELAYED RELEASE ORAL at 09:47

## 2024-11-18 RX ADMIN — DEXAMETHASONE SODIUM PHOSPHATE 8 MG: 4 INJECTION INTRA-ARTICULAR; INTRALESIONAL; INTRAMUSCULAR; INTRAVENOUS; SOFT TISSUE at 13:14

## 2024-11-18 RX ADMIN — DEXAMETHASONE SODIUM PHOSPHATE 8 MG: 4 INJECTION INTRA-ARTICULAR; INTRALESIONAL; INTRAMUSCULAR; INTRAVENOUS; SOFT TISSUE at 09:47

## 2024-11-18 RX ADMIN — Medication 50 MG: at 11:49

## 2024-11-18 RX ADMIN — HYDROMORPHONE HYDROCHLORIDE 0.5 MG: 1 INJECTION, SOLUTION INTRAMUSCULAR; INTRAVENOUS; SUBCUTANEOUS at 13:51

## 2024-11-18 RX ADMIN — GLYCOPYRROLATE 0.2 MG: 0.2 INJECTION INTRAMUSCULAR; INTRAVENOUS at 11:43

## 2024-11-18 RX ADMIN — MIDAZOLAM 2 MG: 1 INJECTION INTRAMUSCULAR; INTRAVENOUS at 11:43

## 2024-11-18 ASSESSMENT — PAIN SCALES - GENERAL
PAINLEVEL_OUTOF10: 10
PAINLEVEL_OUTOF10: 5
PAINLEVEL_OUTOF10: 5
PAINLEVEL_OUTOF10: 0
PAINLEVEL_OUTOF10: 5
PAINLEVEL_OUTOF10: 5
PAINLEVEL_OUTOF10: 4
PAINLEVEL_OUTOF10: 6
PAINLEVEL_OUTOF10: 5
PAINLEVEL_OUTOF10: 9
PAINLEVEL_OUTOF10: 5
PAINLEVEL_OUTOF10: 6
PAINLEVEL_OUTOF10: 5

## 2024-11-18 ASSESSMENT — PAIN DESCRIPTION - DESCRIPTORS
DESCRIPTORS: ACHING;DISCOMFORT;SORE
DESCRIPTORS: ACHING;DISCOMFORT
DESCRIPTORS: ACHING
DESCRIPTORS: ACHING;DISCOMFORT
DESCRIPTORS: ACHING
DESCRIPTORS: ACHING;DISCOMFORT;SORE
DESCRIPTORS: ACHING
DESCRIPTORS: ACHING;DISCOMFORT

## 2024-11-18 ASSESSMENT — PAIN DESCRIPTION - FREQUENCY
FREQUENCY: CONTINUOUS

## 2024-11-18 ASSESSMENT — PAIN DESCRIPTION - LOCATION
LOCATION: HIP

## 2024-11-18 ASSESSMENT — PAIN DESCRIPTION - PAIN TYPE
TYPE: SURGICAL PAIN

## 2024-11-18 ASSESSMENT — PAIN DESCRIPTION - ONSET
ONSET: ON-GOING

## 2024-11-18 ASSESSMENT — PAIN - FUNCTIONAL ASSESSMENT
PAIN_FUNCTIONAL_ASSESSMENT: ACTIVITIES ARE NOT PREVENTED
PAIN_FUNCTIONAL_ASSESSMENT: ACTIVITIES ARE NOT PREVENTED
PAIN_FUNCTIONAL_ASSESSMENT: 0-10
PAIN_FUNCTIONAL_ASSESSMENT: ACTIVITIES ARE NOT PREVENTED

## 2024-11-18 ASSESSMENT — PAIN DESCRIPTION - ORIENTATION
ORIENTATION: RIGHT

## 2024-11-18 NOTE — PROGRESS NOTES
TRANSFER - OUT REPORT:    Verbal report given to Bhavya Hernandez RN on Farzana Membreno  being transferred to Phase II  for routine post-op       Report consisted of patient's Situation, Background, Assessment and   Recommendations(SBAR).     Information from the following report(s) Nurse Handoff Report, Adult Overview, Surgery Report, Intake/Output, MAR, and Cardiac Rhythm SR  was reviewed with the receiving nurse.           Lines:   Peripheral IV 11/18/24 Proximal;Right Forearm (Active)   Site Assessment Clean, dry & intact 11/18/24 1402   Line Status Infusing 11/18/24 1402   Line Care Connections checked and tightened 11/18/24 0948   Phlebitis Assessment No symptoms 11/18/24 1402   Infiltration Assessment 0 11/18/24 1402   Alcohol Cap Used No 11/18/24 0948   Dressing Status Clean, dry & intact 11/18/24 1402   Dressing Type Transparent 11/18/24 1402   Dressing Intervention New 11/18/24 0948        Opportunity for questions and clarification was provided.      Patient transported with:  O2 @ 2lpm and Registered Nurse

## 2024-11-18 NOTE — DISCHARGE SUMMARY
weightbearing exercises as tolerated on her right lower extremity. The patient is to progress from a walker to a cane to complete total weightbearing as tolerable. The patient is to continue to keep her incision dry.  The patient is to followup with Dr. Ehsan Sellers, Anastacia Lopez PA-C, and/or Óscar Hernandez PA-C in the office approximately 10-14 days status post for x-rays and further evaluation.      FAWN Wheeler  11/19/2024

## 2024-11-18 NOTE — PERIOP NOTE
No intake/output data recorded.  I/O this shift:  In: 320 [P.O.:220; I.V.:100]  Out: 350 [Blood:350]

## 2024-11-18 NOTE — ANESTHESIA POSTPROCEDURE EVALUATION
Department of Anesthesiology  Postprocedure Note    Patient: Farzana Membreno  MRN: [id number]  YOB: 1955  Date of evaluation: 11/18/2024    Procedure Summary       Date: 11/18/24 Room / Location: [facility] 10 / [facility] OR    Anesthesia Start: 1143 Anesthesia Stop: 1310    Procedure: RIGHT TOTAL HIP REPLACEMENT ANTERIOR APPROACH WITH C-ARM (Right: Hip) Diagnosis:       Osteoarthritis of right hip, unspecified osteoarthritis type      (Osteoarthritis of right hip, unspecified osteoarthritis type [M16.11])    Surgeons: Ehsan Sellers MD Responsible Provider: Peter Nascimento MD    Anesthesia Type: General ASA Status: 2            Anesthesia Type: General    Nabil Phase I: Nabil Score: 9    Nabil Phase II:      Anesthesia Post Evaluation    Patient location during evaluation: PACU  Patient participation: complete - patient participated  Level of consciousness: awake and alert  Airway patency: patent  Nausea & Vomiting: no nausea and no vomiting  Cardiovascular status: blood pressure returned to baseline  Respiratory status: acceptable  Hydration status: euvolemic  Pain management: adequate    No notable events documented.

## 2024-11-18 NOTE — PERIOP NOTE
TRANSFER - IN REPORT:    Verbal report received from MARK Borges (name) on Farzana Membreno  being received from PACU (unit) for routine progression of patient care      Report consisted of patient’s Situation, Background, Assessment and   Recommendations(SBAR).     Information from the following report(s) Nurse Handoff Report, Surgery Report, Intake/Output, and MAR was reviewed with the receiving nurse.    Opportunity for questions and clarification was provided.      Assessment completed upon patient’s arrival to unit and care assume

## 2024-11-18 NOTE — PERIOP NOTE
Pt. Used restroom in pre-op area with assistance.   Patient placed on Sumit Paws for a minimum of 30 min in  Preop.

## 2024-11-18 NOTE — ANESTHESIA PRE PROCEDURE
CREATININE 0.90 10/22/2024 12:24 PM    LABGLOM 68 10/22/2024 12:24 PM    GLUCOSE 100 10/22/2024 12:24 PM    CALCIUM 9.1 10/22/2024 12:24 PM    BILITOT 0.4 10/22/2024 12:24 PM    ALKPHOS 145 10/22/2024 12:24 PM    AST 20 10/22/2024 12:24 PM    ALT 19 10/22/2024 12:24 PM       POC Tests: No results for input(s): \"POCGLU\", \"POCNA\", \"POCK\", \"POCCL\", \"POCBUN\", \"POCHEMO\", \"POCHCT\" in the last 72 hours.    Coags:   Lab Results   Component Value Date/Time    PROTIME 13.6 10/22/2024 12:24 PM    INR 1.0 10/22/2024 12:24 PM    APTT 25.3 10/22/2024 12:24 PM       HCG (If Applicable): No results found for: \"PREGTESTUR\", \"PREGSERUM\", \"HCG\", \"HCGQUANT\"     ABGs: No results found for: \"PHART\", \"PO2ART\", \"UCX7LCL\", \"ZUN0FTH\", \"BEART\", \"D7HAOBBC\"     Type & Screen (If Applicable):  No results found for: \"ABORH\", \"LABANTI\"    Drug/Infectious Status (If Applicable):  No results found for: \"HIV\", \"HEPCAB\"    COVID-19 Screening (If Applicable): No results found for: \"COVID19\"        Anesthesia Evaluation  Patient summary reviewed and Nursing notes reviewed   no history of anesthetic complications:   Airway: Mallampati: II  TM distance: >3 FB   Neck ROM: full  Mouth opening: > = 3 FB   Dental:    (+) poor dentition      Pulmonary: breath sounds clear to auscultation  (+)           asthma:     (-) COPD and sleep apnea                           Cardiovascular:        (-) hypertension, past MI and  CHF    ECG reviewed  Rhythm: regular  Rate: normal  Echocardiogram reviewed         Beta Blocker:  Not on Beta Blocker         Neuro/Psych:   Negative Neuro/Psych ROS     (-) seizures and CVA           GI/Hepatic/Renal:   (+) GERD: well controlled     (-) liver disease and no renal disease       Endo/Other: Negative Endo/Other ROS   (+) : arthritis: OA..    (-) diabetes mellitus               Abdominal:             Vascular: negative vascular ROS.         Other Findings:             Anesthesia Plan      general     ASA 2       Induction:

## 2024-11-18 NOTE — OP NOTE
condition. All sponge and needle counts were correct.    Signed By: EAMON BAKER MD     November 18, 2024    Operative Note      Patient: Farzana Membreno  YOB: 1955  MRN: [id number]    Date of Procedure: 11/18/2024    Pre-Op Diagnosis Codes:      * Osteoarthritis of right hip, unspecified osteoarthritis type [M16.11]    Post-Op Diagnosis: Same       Procedure(s):  RIGHT TOTAL HIP REPLACEMENT ANTERIOR APPROACH WITH C-ARM    Surgeon(s):  Eamon Baker MD    Assistant:   Surgical Assistant: Fabian Lo  Physician Assistant: Anastacia Lopez PA    Anesthesia: Other    Estimated Blood Loss (mL): 300     Complications: None    Specimens:   * No specimens in log *    Implants:  Implant Name Type Inv. Item Serial No.  Lot No. LRB No. Used Action   LINER ACET OD50MM [id number] NEUT LEGEND - [id number]  LINER ACET OD50MM [id number] NEUT LEGEND  [employer]YLE[employer]WD [id number] Right 1 Implanted   SHELL ACET OD50MM 3 H HIGHLY POR HMSPHR LEGEND - [id number]  SHELL ACET OD50MM 3 H HIGHLY POR HMSPHR LEGEND  [employer]TH[employer]WD [id number] Right 1 Implanted   HEAD FEM [id number] +0MM 12 14 TAPR CERAMIC BIOLOX DELT - [id number]  HEAD FEM [id number] +0MM 12 14 TAPR CERAMIC BIOLOX DELT  ORTH[employer]ELOP[employer] RYLE[employer]WD [id number] Right 1 Implanted   STEM FEM SZ 13 STD HIP CLLR MOD ENTRADA - [id number]  STEM FEM SZ 13 STD HIP CLLR MOD ENTRADA  ORTHO DEVELOPMENT RYLEE-WD [id number] Right 1 Implanted         Drains: * No LDAs found *    Findings:  Infection Present At Time Of Surgery (PATOS) (choose all levels that have infection present):  No infection present  Other Findings: djd hip    Detailed Description of Procedure:   See above note    Electronically signed by EAMON BAKER MD on 11/18/2024 at 12:49 PM

## 2024-11-18 NOTE — PROGRESS NOTES
Occupational Therapy Goals:  Initiated 11/18/2024 to be met within 7-10 days.  Short Term Goals  Time Frame for Short Term Goals: 7 days  Short Term Goal 1: The patient will demonstrate ability to complete LB dressing tasks at supervision level with use of AE as needed.  Short Term Goal 2: The patient will demonstrate ability to complete LB bathing tasks at supervision level with use of AE as needed.  Short Term Goal 3: The patient will demonstrate ability to complete toilet transfers at supervision level.  Short Term Goal 4: The patient will demonstrate ability to complete toileting tasks at supervision level.  Short Term Goal 5: The patient will demonstrate ability to complete grooming tasks standing at sink at supervision level.    OCCUPATIONAL THERAPY EVALUATION    Patient: Farzana Membreno (69 y.o. female)  Date: 11/18/2024  Primary Diagnosis: Osteoarthritis of right hip, unspecified osteoarthritis type [M16.11]  Procedure(s) (LRB):  RIGHT TOTAL HIP REPLACEMENT ANTERIOR APPROACH WITH C-ARM (Right) Day of Surgery   Precautions: Fall Risk, Weight Bearing, Surgical Protocols, Right Lower Extremity Weight Bearing: Weight Bearing As Tolerated,  Hip Precautions: Anterior hip precautions  PLOF: Pt was independent in ADLs and ambulated at mod I with cane prior to surgery.     ASSESSMENT : Pt cleared for therapy evaluation by RN. Upon therapist's arrival, pt was seated in recliner reporting 7/10 pain in R hip. Pt was agreeable to occupational therapy evaluation. Pt seen with PT for second set of skilled hands. OT educated pt regarding the role of occupational therapy. Pt verbalized 100% understanding. OT educated pt regarding anterior hip precautions and weight bearing status s/p R MEIR. OT educated pt regarding  the intended wear schedule for compression stockings s/p surgery. Pt verbalized 100% understanding. OT educated pt regarding use of AE and use of adaptive ADL strategies to improve ease and safety with dressing

## 2024-11-18 NOTE — DISCHARGE INSTRUCTIONS
[facility]   Patient Discharge Instructions    Farzana Membreno / [id number] : 1955    Admitted 2024 Discharged: 2024     IF YOU HAVE ANY PROBLEMS ONCE YOU ARE AT HOME CALL THE FOLLOWING NUMBERS:   Main office number: [phone]    Your follow up appointment to see either Dr. Ehsan Sellers, Anastacia Lopez PA-C, or Óscar Hernandez PA-C as scheduled in 2 weeks. If you are unsure of your appointment date call the office at [phone].    Medication Instructions     Resume your home medictions as directed, you may have directed not to resume supplements until after your follow up.  A prescription for pain medication has been given   It is important that you take the medication exactly as they are prescribed.  Keep your medication in the bottles provided by the pharmacist and keep a list of the medication names, dosages, and times to be taken in your wallet.   Do not take other medications without consulting your doctor.      What to do at Home  Resume your prehospital diet. If you have excessive nausea or vomitting call your doctor's office.     Be sure to maintain adequate fluid intake.     Some pain medications may cause constipation. Remember to drink fluids, stay as active as possible, and eat plenty of fiber-rich foods.    Begin In-Home Physical Therapy; 3 times a week to work on gait training, range of motion, strengthening, and weight bearing exercises as tolerable.    Continue to use your walker or cane when walking.  May progress from the walker to a cane to complete total bearing as tolerable.    Patient may shower.  Wrap incision with plastic wrap/covering to prevent incision from getting wet.  Avoid complete immersion.    YOUR DRESSING SHOULD BE CHANGED BY YOUR HOME PHYSICAL THERAPIST 5-7 AFTER SURGERY ACCORDING TO THE DATE WRITTEN ON YOUR DRESSING.          When to Call    - Call if you have a temperature greater then 101  - Unable to keep food

## 2024-11-18 NOTE — INTERVAL H&P NOTE
Update History & Physical    The patient's History and Physical of November 15, 2024 was reviewed with the patient and I examined the patient. There was no change. The surgical site was confirmed by the patient and me.     Plan: The risks, benefits, expected outcome, and alternative to the recommended procedure have been discussed with the patient. Patient understands and wants to proceed with the procedure.     Electronically signed by EAMON BAKER MD on 11/18/2024 at 9:25 AM

## 2024-11-18 NOTE — PROGRESS NOTES
Physical Therapy Goals:  Pt goals to be met in 1 day:  Pt will be able to perform supine<>sit SBA for transfer ease at home.  Pt will be able to perform sit<>stand SBA for increased ability to transfer at home safely.  Pt will be able to participate in gait training >100' w/ RW, WBAT, GB and CGA/SBA for improved ability in home upon d/c.  Pt will be able to perform stair training step to pattern, B/U rail and CGA to obtain safe entry into home upon d/c.  Pt will be educated regarding HEP per MD protocol for optimal AROM/strength outcomes.        [x]  Patient has met MD mobilization jessi for d/c home   [x]  HH with 24 hour adult care   []  Benefit from additional acute PT session to address:      PHYSICAL THERAPY EVALUATION    Patient: Farzana Membreno (69 y.o. female)  Date: 11/18/2024  Primary Diagnosis: Osteoarthritis of right hip, unspecified osteoarthritis type [M16.11]  Procedure(s) (LRB):  RIGHT TOTAL HIP REPLACEMENT ANTERIOR APPROACH WITH C-ARM (Right) Day of Surgery   Precautions: Fall Risk, Weight Bearing, Surgical Protocols, Right Lower Extremity Weight Bearing: Weight Bearing As Tolerated,  ,  ,  ,  ,  , Hip Precautions: Anterior hip precautions  PLOF: Used SPC for ambulation     ASSESSMENT :  Introduced self to pt and family, ed on role of PT and pt agreeable to PT evaluation.  Based on the objective data described below, the patient presents with decreased mobility in regards to bed mobility, transfers, gait quality, gait tolerance, balance, stair negotiation and safety due to R MEIR surgery.  Decreased AROM of R hip, dec strength R hip, pain in R hip, decreased sensation of R hip, also impacting functional mobility.  Using numerical pain scale, pt rated pain 7/10 pre/during/post session.  Pt and  ed regarding mobility safety, WB, HEP, ice application/use, elevation, environmental safety and home safe techniques.  Pt sitting in recliner upon arrival. Pt required additional time and calming

## (undated) DEVICE — WEREWOLF FASTSEAL 6.0 HEMOSTASIS WAND: Brand: FASTSEAL 6.0 HEMOSTASIS WAND

## (undated) DEVICE — SOLUTION IRRIG 500ML 0.9% SOD CHLO USP POUR PLAS BTL

## (undated) DEVICE — SOLUTION IV NACL 0.9% 100 ML FLX CONTAINER

## (undated) DEVICE — SUTURE VICRYL + SZ 2-0 L36IN ABSRB UD L36MM CT-1 1/2 CIR VCP945H

## (undated) DEVICE — ELECTRODE PT RET AD L9FT HI MOIST COND ADH HYDRGEL CORDED

## (undated) DEVICE — PENCIL ES L3M ROCK SWCH S STL HEX LOK BLDE ELECTRD HOLSTER

## (undated) DEVICE — SUTURE VICRYL + SZ 1 L36IN ABSRB UD L36MM CT-1 1/2 CIR VCP947H

## (undated) DEVICE — SOLUTION IV 1000ML LAC RINGERS PH 6.5 INJ USP VIAFLX PLAS

## (undated) DEVICE — BLADE ES L6IN ELASTOMERIC COAT EXT DURABLE BEND UPTO 90DEG

## (undated) DEVICE — SOLUTION IV 100ML 0.9% SOD CHL DIL INJ

## (undated) DEVICE — GARMENT,MEDLINE,DVT,INT,CALF,MED, GEN2: Brand: MEDLINE

## (undated) DEVICE — Device

## (undated) DEVICE — STRYKER PERFORMANCE SERIES SAGITTAL BLADE: Brand: STRYKER PERFORMANCE SERIES

## (undated) DEVICE — GRIPPER SURGICAL RETRACTOR DISP

## (undated) DEVICE — ZIP 16 SURGICAL SKIN CLOSURE DEVICE: Brand: ZIP 16 SURGICAL SKIN CLOSURE DEVICE